# Patient Record
Sex: FEMALE | Race: WHITE | URBAN - METROPOLITAN AREA
[De-identification: names, ages, dates, MRNs, and addresses within clinical notes are randomized per-mention and may not be internally consistent; named-entity substitution may affect disease eponyms.]

---

## 2017-08-03 ENCOUNTER — TELEPHONE (OUTPATIENT)
Dept: INTERVENTIONAL RADIOLOGY/VASCULAR | Facility: CLINIC | Age: 77
End: 2017-08-03

## 2017-08-03 NOTE — TELEPHONE ENCOUNTER
Interventional Radiology   Interventional Radiology at [facility] has been requested to perform a Retroperitoneal mass biopsy from Dr Harry.  Sandy Corrales is a 77 year old woman with a history of recurrent metastatic endometrial cancer diagnosed in 4/2015 s/p resection and chemotherapy. Recent CT imaging has demonstrated enlargement of an enhancing anterior mid abdominal mass. A biopsy has been requested.     Reviewed imaging and clinical information with IR staff Dr Talley who approved the biopsy anterior mid abdominal mass biopsy with US guidance.   The original order was for a retroperitoneal mass. Review of the imaging does not show a RP mass so clarification of the order was requested from Dr Harry which was sent over today. The order is for a Mid abdominal anterior mass biopsy.   Central scheduling has contacted the patient and scheduled the biopsy for Monday 8/7 17 at 930 in Ultrasound.    Thanks Carlee [facility] Interventional Radiology CNP ([phone])

## 2017-08-04 RX ORDER — LIDOCAINE 40 MG/G
CREAM TOPICAL
Status: CANCELLED | OUTPATIENT
Start: 2017-08-04

## 2017-08-07 ENCOUNTER — HOSPITAL ENCOUNTER (OUTPATIENT)
Dept: ULTRASOUND IMAGING | Facility: CLINIC | Age: 77
End: 2017-08-07
Attending: INTERNAL MEDICINE
Payer: MEDICARE

## 2017-08-07 ENCOUNTER — HOSPITAL ENCOUNTER (OUTPATIENT)
Facility: CLINIC | Age: 77
Discharge: HOME | End: 2017-08-07
Attending: INTERNAL MEDICINE | Admitting: INTERNAL MEDICINE
Payer: MEDICARE

## 2017-08-07 VITALS
OXYGEN SATURATION: 98 % | RESPIRATION RATE: 18 BRPM | SYSTOLIC BLOOD PRESSURE: 131 MMHG | DIASTOLIC BLOOD PRESSURE: 76 MMHG

## 2017-08-07 DIAGNOSIS — C19 COLORECTAL CANCER (H): Primary | ICD-10-CM

## 2017-08-07 DIAGNOSIS — C54.1 ENDOMETRIAL CANCER (H): ICD-10-CM

## 2017-08-07 LAB
INR PPP: 0.9 (ref 0.86–1.14)
PLATELET # BLD AUTO: 178 10E9/L (ref 150–450)

## 2017-08-07 PROCEDURE — 00000159 ZZHCL STATISTIC H-SEND OUTS PREP: Performed by: RADIOLOGY

## 2017-08-07 PROCEDURE — 88360 TUMOR IMMUNOHISTOCHEM/MANUAL: CPT | Mod: 26 | Performed by: RADIOLOGY

## 2017-08-07 PROCEDURE — 76942 ECHO GUIDE FOR BIOPSY: CPT

## 2017-08-07 PROCEDURE — 88305 TISSUE EXAM BY PATHOLOGIST: CPT | Performed by: RADIOLOGY

## 2017-08-07 PROCEDURE — 88172 CYTP DX EVAL FNA 1ST EA SITE: CPT | Mod: 26 | Performed by: RADIOLOGY

## 2017-08-07 PROCEDURE — 36415 COLL VENOUS BLD VENIPUNCTURE: CPT | Performed by: INTERNAL MEDICINE

## 2017-08-07 PROCEDURE — 81288 MLH1 GENE: CPT | Performed by: PATHOLOGY

## 2017-08-07 PROCEDURE — 88173 CYTOPATH EVAL FNA REPORT: CPT | Mod: 26 | Performed by: RADIOLOGY

## 2017-08-07 PROCEDURE — 25000125 ZZHC RX 250: Performed by: RADIOLOGY

## 2017-08-07 PROCEDURE — 88305 TISSUE EXAM BY PATHOLOGIST: CPT | Mod: 26 | Performed by: RADIOLOGY

## 2017-08-07 PROCEDURE — 85049 AUTOMATED PLATELET COUNT: CPT | Performed by: INTERNAL MEDICINE

## 2017-08-07 PROCEDURE — 88360 TUMOR IMMUNOHISTOCHEM/MANUAL: CPT | Performed by: RADIOLOGY

## 2017-08-07 PROCEDURE — 88173 CYTOPATH EVAL FNA REPORT: CPT | Performed by: RADIOLOGY

## 2017-08-07 PROCEDURE — 85610 PROTHROMBIN TIME: CPT | Performed by: INTERNAL MEDICINE

## 2017-08-07 PROCEDURE — 40000863 ZZH STATISTIC RADIOLOGY XRAY, US, CT, MAR, NM

## 2017-08-07 PROCEDURE — 88172 CYTP DX EVAL FNA 1ST EA SITE: CPT | Performed by: RADIOLOGY

## 2017-08-07 RX ORDER — LIDOCAINE HYDROCHLORIDE 10 MG/ML
4 INJECTION, SOLUTION EPIDURAL; INFILTRATION; INTRACAUDAL; PERINEURAL ONCE
Status: COMPLETED | OUTPATIENT
Start: 2017-08-07 | End: 2017-08-07

## 2017-08-07 RX ADMIN — LIDOCAINE HYDROCHLORIDE 40 MG: 10 INJECTION, SOLUTION EPIDURAL; INFILTRATION; INTRACAUDAL; PERINEURAL at 10:31

## 2017-08-07 NOTE — IP AVS SNAPSHOT
[place] [address]    [place] [address]    Phone:  [phone]                                       After Visit Summary   8/7/2017    Sandy Corrales    MRN: [id number]           After Visit Summary Signature Page     I have received my discharge instructions, and my questions have been answered. I have discussed any challenges I see with this plan with the nurse or doctor.    ..........................................................................................................................................  Patient/Patient Representative Signature      ..........................................................................................................................................  Patient Representative Print Name and Relationship to Patient    ..................................................               ................................................  Date                                            Time    ..........................................................................................................................................  Reviewed by Signature/Title    ...................................................              ..............................................  Date                                                            Time

## 2017-08-07 NOTE — PROGRESS NOTES
Pt admitted for abdominal biopsy. Procedure explained and questions answered. States understanding. Denies pain.  at bedside.

## 2017-08-07 NOTE — DISCHARGE INSTRUCTIONS
Abdominal Biopsy Discharge Instructions     After you go home:      You may resume your normal diet    Have an adult stay with you for 6 hours if you received sedation       For 24 hours - due to the sedation you received:    Relax and take it easy    Do NOT make any important or legal decisions    Do NOT drive or operate machines at home or at work    Do NOT drink alcohol    Care of Puncture Site:      For the first 48 hrs, check your puncture site every couple hours while you are awake     You may remove/change the bandaid tomorrow    You may shower tomorrow    No tub baths, whirlpools or swimming until your puncture site has fully healed    Activity       You may go back to normal activity in 24 hours     Wait 48 hours before lifting, straining, exercise or other strenuous activity    Medicines:      You may resume all medications    For minor pain, you may take Acetaminophen (Tylenol) or Ibuprofen (Advil)            Call the provider who ordered this test if:      Increased pain or a large or growing hard lump around the site    Blood or fluid is draining from the site    The site is red, swollen, hot or tender    Chills or a fever greater than 101 F (38 C)    Pain that is getting worse    Any questions or concerns    Call  911 or go to the Emergency Room if:      Severe pain or trouble breathing    Bleeding that you cannot control        If you have questions call:          [facility] Radiology Dept @ [phone]      The provider who performed your procedure was Dr Hope

## 2017-08-07 NOTE — PROGRESS NOTES
Post abdominal wall biopsy assessment. Bandaid CDI to mid abdomen puncture site. Pt denies pain. VSS. No sedation given. Pt discharged per ambulatory at this time. Accompanied by family. Discharge instructions given by admitting nurse. All personal belongings taken with pt.

## 2017-08-07 NOTE — IP AVS SNAPSHOT
MRN:[id number]                      After Visit Summary   8/7/2017    Sandy Corrales    MRN: [id number]           Visit Information        Department      8/7/2017  8:16 AM [facility]          Review of your medicines      UNREVIEWED medicines. Ask your doctor about these medicines        Dose / Directions    ALPRAZOLAM PO        Dose:  0.5 mg   Take 0.5 mg by mouth 2 times daily as needed for anxiety   Refills:  0       AMLODIPINE BESYLATE PO        Dose:  5 mg   Take 5 mg by mouth At Bedtime   Refills:  0       ASCORBIC ACID IJ        Dose:  1 Dose   Inject 1 Dose as directed every 14 days Given in [place]   Refills:  0       ATENOLOL PO        Dose:  50 mg   Take 50 mg by mouth At Bedtime   Refills:  0       COENZYME Q-10 PO        Dose:  400-600 mg   Take 400-600 mg by mouth daily (2-3 capsules x 200mg = 400-600mg)   Refills:  0       Fish Oil 500 MG Caps        Dose:  4167-4430 mg   Take 1,000-1,500 mg by mouth daily   Refills:  0       HYDROmorphone 2 MG tablet   Commonly known as:  DILAUDID   Used for:  Endometrial cancer (H)        Dose:  2 mg   Take 1 tablet (2 mg) by mouth every 3 hours as needed for moderate to severe pain   Quantity:  30 tablet   Refills:  0       ibuprofen 600 MG tablet   Commonly known as:  ADVIL/MOTRIN   Used for:  Endometrial cancer (H)        Dose:  600 mg   Take 1 tablet (600 mg) by mouth every 6 hours as needed for moderate pain   Quantity:  20 tablet   Refills:  0       LISINOPRIL PO        Dose:  40 mg   Take 40 mg by mouth At Bedtime   Refills:  0       magnesium 250 MG tablet        Dose:  1 tablet   Take 1 tablet by mouth daily   Refills:  0       MEGESTROL ACETATE PO        Dose:  20 mg   Take 20 mg by mouth 2 times daily   Refills:  0       MINOXIDIL PO        Dose:  10 mg   Take 10 mg by mouth At Bedtime   Refills:  0       senna-docusate 8.6-50 MG per tablet   Commonly known as:  SENOKOT-S;PERICOLACE   Used for:  Endometrial cancer (H)         Dose:  1-2 tablet   Take 1-2 tablets by mouth 2 times daily as needed for constipation   Quantity:  30 tablet   Refills:  0       SERTRALINE HCL PO        Dose:  25 mg   Take 25 mg by mouth daily   Refills:  0       WOMENS MULTI PO        Dose:  1 tablet   Take 1 tablet by mouth daily   Refills:  0       Zinc 25 MG Tabs        Dose:  25 mg   Take 25 mg by mouth daily   Refills:  0                Protect others around you: Learn how to safely use, store and throw away your medicines at www.disposemymeds.org.         Follow-ups after your visit         Care Instructions        Further instructions from your care team       Abdominal Biopsy Discharge Instructions     After you go home:      You may resume your normal diet    Have an adult stay with you for 6 hours if you received sedation       For 24 hours - due to the sedation you received:    Relax and take it easy    Do NOT make any important or legal decisions    Do NOT drive or operate machines at home or at work    Do NOT drink alcohol    Care of Puncture Site:      For the first 48 hrs, check your puncture site every couple hours while you are awake     You may remove/change the bandaid tomorrow    You may shower tomorrow    No tub baths, whirlpools or swimming until your puncture site has fully healed    Activity       You may go back to normal activity in 24 hours     Wait 48 hours before lifting, straining, exercise or other strenuous activity    Medicines:      You may resume all medications    For minor pain, you may take Acetaminophen (Tylenol) or Ibuprofen (Advil)            Call the provider who ordered this test if:      Increased pain or a large or growing hard lump around the site    Blood or fluid is draining from the site    The site is red, swollen, hot or tender    Chills or a fever greater than 101 F (38 C)    Pain that is getting worse    Any questions or concerns    Call  911 or go to the Emergency Room if:      Severe pain or trouble  "breathing    Bleeding that you cannot control        If you have questions call:          [facility] Dept @ [phone]      The provider who performed your procedure was Dr Hope           Additional Information About Your Visit        MyChart Information     [employer] lets you send messages to your doctor, view your test results, renew your prescriptions, schedule appointments and more. To sign up, go to [email or web address] . Click on \"Log in\" on the left side of the screen, which will take you to the Welcome page. Then click on \"Sign up Now\" on the right side of the page.     You will be asked to enter the access code listed below, as well as some personal information. Please follow the directions to create your username and password.     Your access code is: [email or web address]  Expires: 2017  9:45 AM     Your access code will  in 90 days. If you need help or a new code, please call your [facility] clinic or [phone].        Care EveryWhere ID     This is your Care EveryWhere ID. This could be used by other organizations to access your [facility] medical records  [id number]         Primary Care Provider Office Phone # Fax #    Brandie Choudhary [phone]      Equal Access to Services     HIRO DEL ROSARIO : Hadii kelsi tapia hadasho Soomaali, waaxda luqadaha, qaybta kaalmada adeegyada, jacob mcqueen. So [facility] [phone].    ATENCIÓN: Si habla español, tiene a soriano disposición servicios gratuitos de asistencia lingüística. Jaime al [phone].    We comply with applicable federal civil rights laws and [place] laws. We do not discriminate on the basis of race, color, national origin, age, disability sex, sexual orientation or gender identity.            Thank you!     Thank you for choosing [facility] for your care. Our goal is always to provide you with excellent care. Hearing back from our patients is one way we can continue to improve our services. " Please take a few minutes to complete the written survey that you may receive in the mail after you visit with us. Thank you!             Medication List: This is a list of all your medications and when to take them. Check marks below indicate your daily home schedule. Keep this list as a reference.      Medications           Morning Afternoon Evening Bedtime As Needed    ALPRAZOLAM PO   Take 0.5 mg by mouth 2 times daily as needed for anxiety                                AMLODIPINE BESYLATE PO   Take 5 mg by mouth At Bedtime                                ASCORBIC ACID IJ   Inject 1 Dose as directed every 14 days Given in [place]                                ATENOLOL PO   Take 50 mg by mouth At Bedtime                                COENZYME Q-10 PO   Take 400-600 mg by mouth daily (2-3 capsules x 200mg = 400-600mg)                                Fish Oil 500 MG Caps   Take 1,000-1,500 mg by mouth daily                                HYDROmorphone 2 MG tablet   Commonly known as:  DILAUDID   Take 1 tablet (2 mg) by mouth every 3 hours as needed for moderate to severe pain                                ibuprofen 600 MG tablet   Commonly known as:  ADVIL/MOTRIN   Take 1 tablet (600 mg) by mouth every 6 hours as needed for moderate pain                                LISINOPRIL PO   Take 40 mg by mouth At Bedtime                                magnesium 250 MG tablet   Take 1 tablet by mouth daily                                MEGESTROL ACETATE PO   Take 20 mg by mouth 2 times daily                                MINOXIDIL PO   Take 10 mg by mouth At Bedtime                                senna-docusate 8.6-50 MG per tablet   Commonly known as:  SENOKOT-S;PERICOLACE   Take 1-2 tablets by mouth 2 times daily as needed for constipation                                SERTRALINE HCL PO   Take 25 mg by mouth daily                                WOMENS MULTI PO   Take 1 tablet by mouth daily                                 Zinc 25 MG Tabs   Take 25 mg by mouth daily

## 2017-08-10 LAB — COPATH REPORT: NORMAL

## 2017-08-25 LAB — COPATH REPORT: NORMAL
